# Patient Record
Sex: MALE | HISPANIC OR LATINO | Employment: FULL TIME | URBAN - METROPOLITAN AREA
[De-identification: names, ages, dates, MRNs, and addresses within clinical notes are randomized per-mention and may not be internally consistent; named-entity substitution may affect disease eponyms.]

---

## 2017-04-19 ENCOUNTER — NON-PROVIDER VISIT (OUTPATIENT)
Dept: URGENT CARE | Facility: CLINIC | Age: 27
End: 2017-04-19

## 2017-04-19 DIAGNOSIS — Z02.1 PRE-EMPLOYMENT DRUG SCREENING: ICD-10-CM

## 2017-04-19 LAB
AMP AMPHETAMINE: NORMAL
COC COCAINE: NORMAL
INT CON NEG: NORMAL
INT CON POS: NORMAL
OPI OPIATES: NORMAL
PCP PHENCYCLIDINE: NORMAL
POC DRUG COMMENT 753798-OCCUPATIONAL HEALTH: NEGATIVE
THC: NORMAL

## 2017-04-19 PROCEDURE — 80305 DRUG TEST PRSMV DIR OPT OBS: CPT | Performed by: NURSE PRACTITIONER

## 2017-08-15 ENCOUNTER — HOSPITAL ENCOUNTER (EMERGENCY)
Facility: MEDICAL CENTER | Age: 27
End: 2017-08-15
Attending: EMERGENCY MEDICINE

## 2017-08-15 ENCOUNTER — APPOINTMENT (OUTPATIENT)
Dept: RADIOLOGY | Facility: MEDICAL CENTER | Age: 27
End: 2017-08-15
Attending: EMERGENCY MEDICINE

## 2017-08-15 VITALS
SYSTOLIC BLOOD PRESSURE: 119 MMHG | RESPIRATION RATE: 16 BRPM | BODY MASS INDEX: 34.68 KG/M2 | WEIGHT: 228.84 LBS | HEART RATE: 82 BPM | OXYGEN SATURATION: 94 % | HEIGHT: 68 IN | TEMPERATURE: 98.2 F | DIASTOLIC BLOOD PRESSURE: 79 MMHG

## 2017-08-15 DIAGNOSIS — S93.601A FOOT SPRAIN, RIGHT, INITIAL ENCOUNTER: ICD-10-CM

## 2017-08-15 PROCEDURE — 73630 X-RAY EXAM OF FOOT: CPT | Mod: RT

## 2017-08-15 PROCEDURE — 99284 EMERGENCY DEPT VISIT MOD MDM: CPT

## 2017-08-15 RX ORDER — NAPROXEN 500 MG/1
500 TABLET ORAL 2 TIMES DAILY WITH MEALS
Qty: 20 TAB | Refills: 0 | Status: SHIPPED | OUTPATIENT
Start: 2017-08-15

## 2017-08-15 ASSESSMENT — PAIN SCALES - GENERAL: PAINLEVEL_OUTOF10: 10

## 2017-08-15 NOTE — ED AVS SNAPSHOT
Home Care Instructions                                                                                                                Anant Workman   MRN: [id number]    Department:  [facility], Emergency Dept   Date of Visit:  8/15/2017            [facility], Emergency Dept    [address]    [place] [address]    Phone:  [phone]      You were seen by     Kumar David M.D.      Your Diagnosis Was     Foot sprain, right, initial encounter     S93.845Y       Follow-up Information     1. Follow up with Nima Griffith M.D. In 1 week.    Specialty:  Orthopaedics    Why:  if you are not getting rapidly better.    Contact information    [address]  [address]  [place] [id number] [phone]        Medication Information     Review all of your home medications and newly ordered medications with your primary doctor and/or pharmacist as soon as possible. Follow medication instructions as directed by your doctor and/or pharmacist.     Please keep your complete medication list with you and share with your physician. Update the information when medications are discontinued, doses are changed, or new medications (including over-the-counter products) are added; and carry medication information at all times in the event of emergency situations.               Medication List      START taking these medications        Instructions    Morning Afternoon Evening Bedtime    naproxen 500 MG Tabs   Commonly known as:  NAPROSYN        Take 1 Tab by mouth 2 times a day, with meals.   Dose:  500 mg                             Where to Get Your Medications      You can get these medications from any pharmacy     Bring a paper prescription for each of these medications    - naproxen 500 MG Tabs            Procedures and tests performed during your visit     DX-FOOT-COMPLETE 3+ RIGHT        Discharge Instructions       Foot Sprain  You have a sprained foot. When you  twist your foot, the ligaments that hold the joints together are injured. This may cause pain, swelling, bruising, and difficulty walking. Proper treatment will shorten your disability and help you prevent re-injury. To treat a sprained foot you should:  · Elevate your foot for the next 2-3 days to reduce swelling.   · Apply ice packs to the foot for 20-30 minutes every 2-3 hours.   · Wrap your foot with a compression bandage as long as it is swollen or tender.   · Do not walk on your foot if it still hurts a lot.  Use crutches or a cane until weight bearing becomes painless.   · Special podiatric shoes or shoes with rigid soles may be useful in allowing earlier walking.   Only take over-the-counter or prescription medicines for pain, discomfort, or fever as directed by your caregiver. Most foot sprains will heal completely in 3-6 weeks with proper rest.  If you still have pain or swelling after 2-3 weeks, or if your pain worsens, you should see your doctor for further evaluation.  Document Released: 01/25/2006 Document Revised: 03/11/2013 Document Reviewed: 12/19/2009  ExitCare® Patient Information ©2013 [employer].          Patient Information     Patient Information    Following emergency treatment: all patient requiring follow-up care must return either to a private physician or a clinic if your condition worsens before you are able to obtain further medical attention, please return to the emergency room.     Billing Information    At [facility], we work to make the billing process streamlined for our patients.  Our Representatives are here to answer any questions you may have regarding your hospital bill.  If you have insurance coverage and have supplied your insurance information to us, we will submit a claim to your insurer on your behalf.  Should you have any questions regarding your bill, we can be reached online or by phone as follows:  Online: You are able pay your bills online or live chat with our  representatives about any billing questions you may have. We are here to help Monday - Friday from 8:00am to 7:30pm and 9:00am - 12:00pm on Saturdays.  Please visit [email or web address]  for more information.   Phone:  [phone] or [phone]    Please note that your emergency physician, surgeon, pathologist, radiologist, anesthesiologist, and other specialists are not employed by [facility] and will therefore bill separately for their services.  Please contact them directly for any questions concerning their bills at the numbers below:     Emergency Physician Services:  [phone]  [facility]:  [phone]  [facility]:  [phone]  [facility]:  [phone]    1. Your final bill may vary from the amount quoted upon discharge if all procedures are not complete at that time, or if your doctor has additional procedures of which we are not aware. You will receive an additional bill if you return to the Emergency Department at [facility] for suture removal regardless of the facility of which the sutures were placed.     2. Please arrange for settlement of this account at the emergency registration.    3. All self-pay accounts are due in full at the time of treatment.  If you are unable to meet this obligation then payment is expected within 4-5 days.     4. If you have had radiology studies (CT, X-ray, Ultrasound, MRI), you have received a preliminary result during your emergency department visit. Please contact the radiology department [phone] to receive a copy of your final result. Please discuss the Final result with your primary physician or with the follow up physician provided.     Crisis Hotline:  [place] Crisis Hotline:  1-781-MDOBBYK or 1-759.747.3909  [place] Crisis Hotline:    1-991.566.6637 or 431-355-5472         ED Discharge Follow Up Questions    1. In order to provide you with very good care, we would  like to follow up with a phone call in the next few days.  May we have your permission to contact you?     YES /  NO    2. What is the best phone number to call you? (       )_____-__________    3. What is the best time to call you?      Morning  /  Afternoon  /  Evening                   Patient Signature:  ____________________________________________________________    Date:  ____________________________________________________________

## 2017-08-15 NOTE — ED AVS SNAPSHOT
IT Trading Access Code: [email or web address]  Expires: 9/14/2017  9:35 PM    Your email address is not on file at [facility].  Email Addresses are required for you to sign up for IT Trading, please contact [phone] to verify your personal information and to provide your email address prior to attempting to register for IT Trading.    Anant Perezdonado  [address]  [place] [address]    IT Trading  A secure, online tool to manage your health information     [facility]’s IT Trading® is a secure, online tool that connects you to your personalized health information from the privacy of your home -- day or night - making it very easy for you to manage your healthcare. Once the activation process is completed, you can even access your medical information using the IT Trading mk, which is available for free in the Apple Mk store or Google Play store.     To learn more about IT Trading, visit [email or web address].[email or web address]/IT Trading    There are two levels of access available (as shown below):   My Chart Features  [facility] Primary Care Doctor [facility]  Specialists [facility]  Urgent  Care [facility] Primary Care Doctor   Email your healthcare team securely and privately 24/7 X X X    Manage appointments: schedule your next appointment; view details of past/upcoming appointments X      Request prescription refills. X      View recent personal medical records, including lab and immunizations X X X X   View health record, including health history, allergies, medications X X X X   Read reports about your outpatient visits, procedures, consult and ER notes X X X X   See your discharge summary, which is a recap of your hospital and/or ER visit that includes your diagnosis, lab results, and care plan X X  X     How to register for IT Trading:  Once your e-mail address has been verified, follow the following steps to sign up for IT Trading.     1. Go to  [email or web address]  2. Click on the Sign Up Now box, which takes you to the New Member Sign Up page.  You will need to provide the following information:  a. Enter your [employer] Access Code exactly as it appears at the top of this page. (You will not need to use this code after you’ve completed the sign-up process. If you do not sign up before the expiration date, you must request a new code.)   b. Enter your date of birth.   c. Enter your home email address.   d. Click Submit, and follow the next screen’s instructions.  3. Create a Transit Appt ID. This will be your Cortilia login ID and cannot be changed, so think of one that is secure and easy to remember.  4. Create a Cortilia password. You can change your password at any time.  5. Enter your Password Reset Question and Answer. This can be used at a later time if you forget your password.   6. Enter your e-mail address. This allows you to receive e-mail notifications when new information is available in Cortilia.  7. Click Sign Up. You can now view your health information.    For assistance activating your Cort[employer] account, call [phone]

## 2017-08-15 NOTE — ED AVS SNAPSHOT
8/15/2017    Anant Workman  [address]  [facility] [address]    Dear Anant:    [facility] wants to ensure your discharge home is safe and you or your loved ones have had all of your questions answered regarding your care after you leave the hospital.    Below is a list of resources and contact information should you have any questions regarding your hospital stay, follow-up instructions, or active medical symptoms.    Questions or Concerns Regarding… Contact   Medical Questions Related to Your Discharge  (7 days a week, 8am-5pm) Contact a Nurse Care Coordinator   [phone]   Medical Questions Not Related to Your Discharge  (24 hours a day / 7 days a week)  Contact the Nurse Health Line   [phone]    Medications or Discharge Instructions Refer to your discharge packet   or contact your [facility] Primary Care Provider   [phone]   Follow-up Appointment(s) Schedule your appointment via Wapi   or contact Scheduling [phone]   Billing Review your statement via Wapi  or contact Billing [phone]   Medical Records Review your records via Wapi   or contact Medical Records [phone]     You may receive a telephone call within two days of discharge. This call is to make certain you understand your discharge instructions and have the opportunity to have any questions answered. You can also easily access your medical information, test results and upcoming appointments via the Wapi free online health management tool. You can learn more and sign up at [email or web address]. For assistance setting up your Wapi account, please call [phone].    Once again, we want to ensure your discharge home is safe and that you have a clear understanding of any next steps in your care. If you have any questions or concerns, please do not hesitate to contact us, we are here for you. Thank you for choosing [facility] for your healthcare needs.    Sincerely,    Your [facility] Healthcare Team

## 2017-08-16 NOTE — DISCHARGE INSTRUCTIONS
Foot Sprain  You have a sprained foot. When you twist your foot, the ligaments that hold the joints together are injured. This may cause pain, swelling, bruising, and difficulty walking. Proper treatment will shorten your disability and help you prevent re-injury. To treat a sprained foot you should:  · Elevate your foot for the next 2-3 days to reduce swelling.   · Apply ice packs to the foot for 20-30 minutes every 2-3 hours.   · Wrap your foot with a compression bandage as long as it is swollen or tender.   · Do not walk on your foot if it still hurts a lot.  Use crutches or a cane until weight bearing becomes painless.   · Special podiatric shoes or shoes with rigid soles may be useful in allowing earlier walking.   Only take over-the-counter or prescription medicines for pain, discomfort, or fever as directed by your caregiver. Most foot sprains will heal completely in 3-6 weeks with proper rest.  If you still have pain or swelling after 2-3 weeks, or if your pain worsens, you should see your doctor for further evaluation.  Document Released: 01/25/2006 Document Revised: 03/11/2013 Document Reviewed: 12/19/2009  Telcare® Patient Information ©2013 [employer].

## 2017-08-16 NOTE — ED NOTES
Discharge instructions provided.  Work excuse given, Rx x1 given and educated on how and when to take medication, Pt verbalized the understanding of discharge instructions to follow up with PCP and to return to ER if condition worsens.  Pt ambulated out of ER without difficulty.

## 2017-08-16 NOTE — ED NOTES
"Chief Complaint   Patient presents with   • Foot Pain     Twisted right foot walking down the stairs this am, swelling and bruising to lateral foot. Able to ambulate.      CMS intact       /79 mmHg  Pulse 84  Temp(Src) 36.8 °C (98.2 °F)  Resp 18  Ht 1.727 m (5' 7.99\")  Wt 103.8 kg (228 lb 13.4 oz)  BMI 34.80 kg/m2  SpO2 97%    "

## 2017-10-30 NOTE — ED PROVIDER NOTES
"ED Provider Note    Scribed for Kumar David M.D. by Kumar David. 8/15/2017  9:27 PM    CHIEF COMPLAINT  Chief Complaint   Patient presents with   • Foot Pain     Twisted right foot walking down the stairs this am, swelling and bruising to lateral foot. Able to ambulate.        HPI  Anant Workman is a 27 y.o. male who presents to the Emergency Room for dorsal lateral right foot pain, after twisting it, while walking down the stairs this morning. He's had progressive swelling and bruising to the lateral aspect of the dorsum of the foot. He is ambulatory in flip-flops. He denies any pain to the ankle, lower leg, or knee. No significant previous injuries to this foot.      REVIEW OF SYSTEMS  See HPI for further details.    PAST MEDICAL HISTORY    no diabetes, immunosuppression, no frequent fractures.    SOCIAL HISTORY  Social History     Social History Main Topics   • Smoking status: Never Smoker    • Smokeless tobacco: Never Used   • Alcohol Use: No   • Drug Use: No   • Sexual Activity: Not on file       SURGICAL HISTORY   has past surgical history that includes inguinal hernia laparoscopic (4/21/2015) and umbilical hernia repair (4/21/2015).    CURRENT MEDICATIONS  Home Medications     Reviewed by Lennie Cohn R.N. (Registered Nurse) on 08/15/17 at 2039  Med List Status: Complete    Medication Last Dose Status          Patient Shahzad Taking any Medications                        ALLERGIES  No Known Allergies    PHYSICAL EXAM  VITAL SIGNS: /79 mmHg  Pulse 82  Temp(Src) 36.8 °C (98.2 °F)  Resp 16  Ht 1.727 m (5' 7.99\")  Wt 103.8 kg (228 lb 13.4 oz)  BMI 34.80 kg/m2  SpO2 94%  Pulse ox interpretation: I interpret this pulse ox as normal.  Constitutional: Alert in no apparent distress.  HENT: Normocephalic, Atraumatic, Bilateral external ears normal. Nose normal.   Eyes: Conjunctiva normal, non-icteric.   Heart: Normal peripheral perfusion  Lungs: Unlabored " respirations  Musculoskeletal: R foot has dorsal lateral lateral swelling and moderate diffuse tenderness to palpation. Ambulatory.   Neurologic: Alert, Grossly non-focal.   Psychiatric: Affect normal, Judgment normal, Mood normal, Appears appropriate and not intoxicated.     DX-FOOT-COMPLETE 3+ RIGHT   Final Result         No acute osseous abnormality.          COURSE & MEDICAL DECISION MAKING  The patient's VS, Nurses notes reviewed. (See chart for details)    9:27 PM Patient seen and examined at bedside. Due to the extended wait times, x-rays were ordered from an performed while the patient was still in the waiting room. I was able to inform him that my initial evaluation that his foot shows no evidence of fracture or dislocation Patient will be treated with ice and nonsteroidal anti-inflammatory medications, and given an Aircast for additional support. We discussed its use as needed, and he understands that he should remain active, but his foot when he is not up and around, and stop using the Aircast as soon as he feels able to ambulate without it. He is given contact information for orthopedics, with the understanding that failure to rapidly improve over the next 7-10 days should warrant orthopedic evaluation for a potential high-grade sprain.    The patient will return for new or worsening symptoms and is stable at the time of discharge.    The patient is referred to a primary physician for blood pressure management, diabetic screening, and for all other preventative health concerns.    DISPOSITION:  Patient will be discharged home in stable condition.    FOLLOW UP:  Nima Griffith M.D.  [address]  [address]  [place] [address]  [phone]    In 1 week  if you are not getting rapidly better.      OUTPATIENT MEDICATIONS:  Discharge Medication List as of 8/15/2017  9:35 PM      START taking these medications    Details   naproxen (NAPROSYN) 500 MG Tab Take 1 Tab by mouth 2 times a day, with meals., Disp-20  Tab, R-0, Print Rx Paper               FINAL IMPRESSION  1. Foot sprain, right, initial encounter         negative